# Patient Record
Sex: MALE | Race: WHITE | Employment: FULL TIME | URBAN - METROPOLITAN AREA
[De-identification: names, ages, dates, MRNs, and addresses within clinical notes are randomized per-mention and may not be internally consistent; named-entity substitution may affect disease eponyms.]

---

## 2019-06-03 ENCOUNTER — OFFICE VISIT (OUTPATIENT)
Dept: ORTHOPEDIC SURGERY | Age: 69
End: 2019-06-03
Payer: MEDICARE

## 2019-06-03 VITALS — HEIGHT: 71 IN | BODY MASS INDEX: 30.37 KG/M2 | WEIGHT: 216.93 LBS

## 2019-06-03 DIAGNOSIS — Z96.649 S/P TOTAL HIP RESURFACING: ICD-10-CM

## 2019-06-03 DIAGNOSIS — M25.552 LEFT HIP PAIN: Primary | ICD-10-CM

## 2019-06-03 PROCEDURE — G8427 DOCREV CUR MEDS BY ELIG CLIN: HCPCS | Performed by: ORTHOPAEDIC SURGERY

## 2019-06-03 PROCEDURE — G8417 CALC BMI ABV UP PARAM F/U: HCPCS | Performed by: ORTHOPAEDIC SURGERY

## 2019-06-03 PROCEDURE — 4040F PNEUMOC VAC/ADMIN/RCVD: CPT | Performed by: ORTHOPAEDIC SURGERY

## 2019-06-03 PROCEDURE — 99214 OFFICE O/P EST MOD 30 MIN: CPT | Performed by: ORTHOPAEDIC SURGERY

## 2019-06-03 PROCEDURE — 4004F PT TOBACCO SCREEN RCVD TLK: CPT | Performed by: ORTHOPAEDIC SURGERY

## 2019-06-03 PROCEDURE — 1123F ACP DISCUSS/DSCN MKR DOCD: CPT | Performed by: ORTHOPAEDIC SURGERY

## 2019-06-03 PROCEDURE — 3017F COLORECTAL CA SCREEN DOC REV: CPT | Performed by: ORTHOPAEDIC SURGERY

## 2019-06-03 NOTE — PROGRESS NOTES
CHIEF COMPLAINT:    Chief Complaint   Patient presents with    Hip Pain      LEFT THR YEARLY CHECK- DOING WELL       HISTORY OF PRESENT ILLNESS:    This gentleman returns in long-term follow-up status post his hip replacement on the LEFT said that was a [place] from several years ago. He voices no complaints whatsoever. The patient is a 78 y.o. male   Past Medical History:   Diagnosis Date    Arthritis     Hypertension         Work Status: The pain assessment was noted & is as follows:  Pain Assessment  Location of Pain: Pelvis  Location Modifiers: Left  Severity of Pain: 0]      Work Status/Functionality:     Past Medical History: Medical history form was reviewed today & can be found in the media tab  Past Medical History:   Diagnosis Date    Arthritis     Hypertension       Past Surgical History:     Past Surgical History:   Procedure Laterality Date    CARPAL TUNNEL RELEASE Left 4/27/2015    HIP SURGERY  2009    [place] hip resurfacing; anesthesia shut down digestive system post op    MANDIBLE SURGERY      cysyt removed 2014    TONSILLECTOMY  1956    ULNAR TUNNEL RELEASE Left 4/27/2015     Current Medications:     Current Outpatient Medications:     aspirin 81 MG tablet, Take 81 mg by mouth daily. , Disp: , Rfl:     Glucosamine 750 MG TABS, Take 1 tablet by mouth daily. Verify dose with patient DOS, Disp: , Rfl:     Multiple Vitamins-Minerals (ICAPS AREDS FORMULA PO), Take 1 tablet by mouth daily. , Disp: , Rfl:     lisinopril-hydrochlorothiazide (PRINZIDE;ZESTORETIC) 10-12.5 MG per tablet, Take 1 tablet by mouth daily. , Disp: , Rfl:     finasteride (PROSCAR) 5 MG tablet, Take 5 mg by mouth daily. , Disp: , Rfl:   Allergies:  Patient has no known allergies. Social History:    reports that he has quit smoking. He does not have any smokeless tobacco history on file. He reports that he drinks alcohol. He reports that he does not use drugs.   Family History:   No family history on file. REVIEW OF SYSTEMS:   For new problems, a full review of systems will be found scanned in the patient's chart. CONSTITUTIONAL: Denies unexplained weight loss, fevers, chills   NEUROLOGICAL: Denies unsteady gait or progressive weakness  SKIN: Denies skin changes, delayed healing, rash, itching       PHYSICAL EXAM:    Vitals: Height 5' 10.87\" (1.8 m), weight 216 lb 14.9 oz (98.4 kg). GENERAL EXAM:  · General Apparence: Patient is adequately groomed with no evidence of malnutrition. · Orientation: The patient is oriented to time, place and person. · Mood & Affect:The patient's mood and affect are appropriate       LEFT hip PHYSICAL EXAMINATION:  · Inspection:  No visible asymmetry or deformity. · Palpation:  No tenderness anywhere around the hip. · Range of Motion: Excision range of motion. Flexion to 105° internal rotation is 15 years rotation stability    · Strength: Normal    · Special Tests:    · Straight leg raise examination. Negative log roll examination. · Skin:  There are no rashes, ulcerations or lesions. · There are  dysvascular changes     Gait & station: Normal      Additional Examinations:        Right hip demonstrates a diminution and range of motion. Internal rotation is 5 special rotations 15. Flexion is 90. Diagnostic Testing: The following x rays were read and interpreted by myself      2 x-ray views of the LEFT hip including AP pelvis and lateral LEFT hip demonstrates a [place] hip resurfacing arthroplasty in good position. No signs of failure or malpositioning. Orders     Orders Placed This Encounter   Procedures    XR HIP LEFT (2-3 VIEWS)     Standing Status:   Future     Number of Occurrences:   1     Standing Expiration Date:   6/3/2020         Assessment / Treatment Plan:     1. LEFT [place] hip replacement. The patient can return in 3 years for reevaluation. Activity as tolerated without limitations.     2. I have personally